# Patient Record
Sex: FEMALE | Race: BLACK OR AFRICAN AMERICAN
[De-identification: names, ages, dates, MRNs, and addresses within clinical notes are randomized per-mention and may not be internally consistent; named-entity substitution may affect disease eponyms.]

---

## 2019-06-18 ENCOUNTER — HOSPITAL ENCOUNTER (OUTPATIENT)
Dept: HOSPITAL 35 - MRI | Age: 44
End: 2019-06-18
Attending: NURSE PRACTITIONER
Payer: COMMERCIAL

## 2019-06-18 DIAGNOSIS — M48.061: ICD-10-CM
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
## 2019-06-18 DIAGNOSIS — M47.26: Primary | ICD-10-CM

## 2019-06-18 DIAGNOSIS — E01.0: ICD-10-CM

## 2019-06-18 DIAGNOSIS — M51.16: ICD-10-CM

## 2019-06-24 ENCOUNTER — HOSPITAL ENCOUNTER (OUTPATIENT)
Dept: HOSPITAL 35 - ULTRA | Age: 44
End: 2019-06-24
Attending: NURSE PRACTITIONER
Payer: COMMERCIAL

## 2019-06-24 DIAGNOSIS — E01.0: Primary | ICD-10-CM

## 2019-06-28 ENCOUNTER — HOSPITAL ENCOUNTER (OUTPATIENT)
Dept: HOSPITAL 35 - PAIN | Age: 44
End: 2019-06-28
Payer: COMMERCIAL

## 2019-06-28 VITALS — SYSTOLIC BLOOD PRESSURE: 147 MMHG | DIASTOLIC BLOOD PRESSURE: 84 MMHG

## 2019-06-28 VITALS — HEIGHT: 62.99 IN | WEIGHT: 160 LBS | BODY MASS INDEX: 28.35 KG/M2

## 2019-06-28 DIAGNOSIS — M19.90: ICD-10-CM

## 2019-06-28 DIAGNOSIS — M54.5: ICD-10-CM

## 2019-06-28 DIAGNOSIS — M79.18: Primary | ICD-10-CM

## 2019-06-28 DIAGNOSIS — Z79.891: ICD-10-CM

## 2019-06-28 NOTE — NUR
Pain Clinic Assessment:
 
1. History of Osteoarthritis:
Not Applicable
   History of Rheumatoid Arthritis:
Not Applicable
 
2. Height: 5 ft. 3 in. 160.0 cm.
   Weight: 160.0 lb.  oz. 72.576 kg.
   Patient's BMI: 28.4
 
3. Vital Signs:
   BP: 147/84 Pulse: 76 Resp: 14
   Temp:  02 Sat: 100 ECG Mon:
 
4. Pain Intensity: 8
 
5. Fall Risk:
   Dizziness: N  Needs help standing or walking: N
   Fallen in the last 3 months: N
   Fall risk comments:
 
 
6. Patient on Blood Thinner: None
 
7. History of Hypertension: N
 
8. Opioid Therapy greater than 6 weeks: N
   Opiate Contract Signed:
 
9. Risk Assessment Tool Provided: LOW RISK 0/3
 
10. Functional Assessment Tool: 70/70
 
11. Recreational Drug Use: Never Drug Type:
    Tobacco Use: Never Smoker Tobacco Type:
       Amount or Packs/day:  How Many Years:
    Alcohol Use: No  Frequency:  Quant: